# Patient Record
Sex: FEMALE | Race: WHITE | Employment: UNEMPLOYED | URBAN - METROPOLITAN AREA
[De-identification: names, ages, dates, MRNs, and addresses within clinical notes are randomized per-mention and may not be internally consistent; named-entity substitution may affect disease eponyms.]

---

## 2023-11-06 ENCOUNTER — HOSPITAL ENCOUNTER (EMERGENCY)
Age: 28
Discharge: HOME | End: 2023-11-06
Attending: EMERGENCY MEDICINE
Payer: COMMERCIAL

## 2023-11-06 VITALS
SYSTOLIC BLOOD PRESSURE: 131 MMHG | BODY MASS INDEX: 35.85 KG/M2 | WEIGHT: 242.06 LBS | RESPIRATION RATE: 15 BRPM | HEIGHT: 69 IN | DIASTOLIC BLOOD PRESSURE: 74 MMHG | HEART RATE: 99 BPM | TEMPERATURE: 98.5 F | OXYGEN SATURATION: 100 %

## 2023-11-06 DIAGNOSIS — L05.01 PILONIDAL CYST WITH ABSCESS: Primary | ICD-10-CM

## 2023-11-06 PROCEDURE — 99283 EMERGENCY DEPT VISIT LOW MDM: CPT

## 2023-11-06 PROCEDURE — 2500000003 HC RX 250 WO HCPCS

## 2023-11-06 PROCEDURE — 10080 I&D PILONIDAL CYST SIMPLE: CPT

## 2023-11-06 RX ORDER — LIDOCAINE HYDROCHLORIDE 10 MG/ML
INJECTION, SOLUTION EPIDURAL; INFILTRATION; INTRACAUDAL; PERINEURAL
Status: COMPLETED
Start: 2023-11-06 | End: 2023-11-06

## 2023-11-06 RX ORDER — CEPHALEXIN 500 MG/1
500 CAPSULE ORAL 4 TIMES DAILY
Qty: 40 CAPSULE | Refills: 0 | Status: SHIPPED | OUTPATIENT
Start: 2023-11-06 | End: 2023-11-16

## 2023-11-06 RX ADMIN — LIDOCAINE HYDROCHLORIDE: 10 INJECTION, SOLUTION EPIDURAL; INFILTRATION; INTRACAUDAL; PERINEURAL at 15:11

## 2023-11-06 ASSESSMENT — PAIN SCALES - GENERAL
PAINLEVEL_OUTOF10: 5
PAINLEVEL_OUTOF10: 0

## 2023-11-06 ASSESSMENT — PAIN - FUNCTIONAL ASSESSMENT
PAIN_FUNCTIONAL_ASSESSMENT: 0-10
PAIN_FUNCTIONAL_ASSESSMENT: 0-10

## 2023-11-06 ASSESSMENT — LIFESTYLE VARIABLES: HOW OFTEN DO YOU HAVE A DRINK CONTAINING ALCOHOL: NEVER

## 2023-11-06 ASSESSMENT — PAIN DESCRIPTION - PAIN TYPE: TYPE: ACUTE PAIN

## 2023-11-06 ASSESSMENT — PAIN DESCRIPTION - LOCATION: LOCATION: BUTTOCKS

## 2023-11-06 ASSESSMENT — PAIN DESCRIPTION - DESCRIPTORS: DESCRIPTORS: ACHING

## 2023-11-06 ASSESSMENT — PAIN DESCRIPTION - FREQUENCY: FREQUENCY: CONTINUOUS

## 2023-11-06 NOTE — ED NOTES
Discharge and education instructions reviewed. Patient verbalized understanding, teach-back successful. Patient denied questions at this time. No acute distress noted. Patient instructed to follow-up as noted - return to emergency department if symptoms worsen. Patient verbalized understanding. Discharged per EDMD with discharge instructions.           Nelia Booth RN  11/06/23 0855

## 2024-03-01 ENCOUNTER — OFFICE VISIT (OUTPATIENT)
Age: 29
End: 2024-03-01

## 2024-03-01 VITALS
HEART RATE: 117 BPM | TEMPERATURE: 97.8 F | SYSTOLIC BLOOD PRESSURE: 113 MMHG | DIASTOLIC BLOOD PRESSURE: 75 MMHG | WEIGHT: 241 LBS | OXYGEN SATURATION: 96 % | BODY MASS INDEX: 35.59 KG/M2

## 2024-03-01 DIAGNOSIS — J01.00 ACUTE MAXILLARY SINUSITIS, RECURRENCE NOT SPECIFIED: Primary | ICD-10-CM

## 2024-03-01 RX ORDER — PROGESTERONE 200 MG/1
200 CAPSULE ORAL NIGHTLY
COMMUNITY
Start: 2024-01-10

## 2024-03-01 RX ORDER — AZITHROMYCIN 250 MG/1
TABLET, FILM COATED ORAL
Qty: 6 TABLET | Refills: 0 | Status: SHIPPED | OUTPATIENT
Start: 2024-03-01 | End: 2024-03-11

## 2024-03-01 RX ORDER — ESCITALOPRAM OXALATE 10 MG/1
10 TABLET ORAL DAILY
COMMUNITY

## 2024-03-01 RX ORDER — EMTRICITABINE AND TENOFOVIR DISOPROXIL FUMARATE 200; 300 MG/1; MG/1
1 TABLET, FILM COATED ORAL DAILY
COMMUNITY
Start: 2024-01-29

## 2024-03-01 RX ORDER — ESTRADIOL 2 MG/1
TABLET ORAL
COMMUNITY
Start: 2021-10-01

## 2024-03-01 ASSESSMENT — ENCOUNTER SYMPTOMS
ABDOMINAL PAIN: 0
SINUS PAIN: 1
NAUSEA: 0
SINUS PRESSURE: 1
VOMITING: 0
CONSTIPATION: 0
COUGH: 0
SHORTNESS OF BREATH: 0
DIARRHEA: 0
SORE THROAT: 0
CHEST TIGHTNESS: 0

## 2024-03-01 NOTE — PROGRESS NOTES
Cornelia Flores (:  1995) is a 28 y.o. female,New patient, here for evaluation of the following chief complaint(s):  Sinus Problem (Pt in c/o sinus pressure and ha. Had flu a week ago. All flu sxs resolved. Yesterday started with oliva and sinus pressure. )      ASSESSMENT/PLAN:    ICD-10-CM    1. Acute maxillary sinusitis, recurrence not specified  J01.00 azithromycin (ZITHROMAX) 250 MG tablet          Patient presents for sinus pressure. Patient was diagnosed with influenza. On exam no wheezes, rhonchi, rales noted.   Although patient was diagnosed with influenza, they have been ongoing sinus pressure and we will send zpack.   Follow up with PCP in 7-10 days if symptoms persist or if symptoms worsen.    SUBJECTIVE/OBJECTIVE:    History provided by:  Patient   used: No      HPI:   28 y.o. female presents with symptoms of thick and yellow mucus and pressure resolved. Patient was positive for influenza last Friday and then symptoms began resolving and then sinus pressure started. Patient did take dayquil for symptoms and pseudoephedrine.  Afebrile.     Vitals:    24 1317 24 1421   BP: (!) 88/66  Comment: pt states always has  low bp. 113/75   Site: Left Upper Arm Right Upper Arm   Position: Sitting Sitting   Cuff Size: Large Adult Large Adult   Pulse: (!) 117    Temp: 97.8 °F (36.6 °C)    SpO2: 96%    Weight: 109.3 kg (241 lb)        Review of Systems   Constitutional:  Negative for fatigue and fever.   HENT:  Positive for congestion, sinus pressure and sinus pain. Negative for sore throat.    Respiratory:  Negative for cough, chest tightness and shortness of breath.    Cardiovascular:  Negative for chest pain.   Gastrointestinal:  Negative for abdominal pain, constipation, diarrhea, nausea and vomiting.   Genitourinary:  Negative for urgency.   Musculoskeletal:  Negative for neck pain and neck stiffness.   Skin:  Negative for rash.   Neurological:  Negative for dizziness.

## 2024-03-01 NOTE — PATIENT INSTRUCTIONS
Please take medications as prescribed.  Please follow up with family doctor if symptoms persist.   You can also start mucinex for symptoms.

## 2024-10-17 ENCOUNTER — OFFICE VISIT (OUTPATIENT)
Age: 29
End: 2024-10-17

## 2024-10-17 VITALS
SYSTOLIC BLOOD PRESSURE: 107 MMHG | OXYGEN SATURATION: 95 % | WEIGHT: 243.6 LBS | BODY MASS INDEX: 36.08 KG/M2 | HEIGHT: 69 IN | DIASTOLIC BLOOD PRESSURE: 75 MMHG | HEART RATE: 111 BPM | TEMPERATURE: 98.5 F

## 2024-10-17 DIAGNOSIS — J02.0 STREP PHARYNGITIS: Primary | ICD-10-CM

## 2024-10-17 DIAGNOSIS — J02.9 ACUTE PHARYNGITIS, UNSPECIFIED ETIOLOGY: ICD-10-CM

## 2024-10-17 PROBLEM — Z79.899 ON PRE-EXPOSURE PROPHYLAXIS FOR HIV: Status: ACTIVE | Noted: 2024-01-10

## 2024-10-17 PROBLEM — F64.9 GENDER DYSPHORIA: Status: ACTIVE | Noted: 2023-03-15

## 2024-10-17 LAB — S PYO AG THROAT QL: POSITIVE

## 2024-10-17 RX ORDER — AMOXICILLIN 500 MG/1
500 CAPSULE ORAL 2 TIMES DAILY
Qty: 20 CAPSULE | Refills: 0 | Status: SHIPPED | OUTPATIENT
Start: 2024-10-17 | End: 2024-10-27

## 2024-10-17 NOTE — PROGRESS NOTES
[facility]    Cornelia Flores (:  1995 MRN: [id number]) is a 29 y.o. adult, here for evaluation of the following chief complaint(s):  Pharyngitis (PT. C/O PHARYNGITIS THAT STARTED LAST NIGHT, C/O SCRATCHY AND REDNESS TO THROAT, STATES KIDS WERE DX WITH STREP YESTERDAY.)    ASSESSMENT/PLAN:    ICD-10-CM    1. Strep pharyngitis  J02.0       2. Acute pharyngitis, unspecified etiology  J02.9 POCT rapid strep A          New Prescriptions    AMOXICILLIN (AMOXIL) 500 MG CAPSULE    Take 1 capsule by mouth 2 times daily for 10 days     Summary  - The patient tested positive for streptococcus at today's visit.   - I explained the warning signs of when to go to the emergency room.   - Follow up discussed and will be on an as-needed basis.  Differentials include: COVID-19, Laryngitis, GERD, Mononucleosis, Viral Syndrome, Tonsillitis, Post Nasal Drip,    SUBJECTIVE/OBJECTIVE:  HPI  Onset for this issue was 2 day(s) ago. Since the onset, symptoms have been gradually worsening.  Negative symptoms include fever.  Treatments tried include Acetaminophen, NSAID. Reportedly was recently around someone who has strep.       Vitals:    10/17/24 1224   BP: 107/75   Site: Right Upper Arm   Position: Sitting   Cuff Size: Large Adult   Pulse: (!) 111   Temp: 98.5 °F (36.9 °C)   TempSrc: Oral   SpO2: 95%   Weight: 110.5 kg (243 lb 9.6 oz)   Height: 1.753 m (5' 9\")       Results for orders placed or performed in visit on 10/17/24   POCT rapid strep A   Result Value Ref Range    Strep A Ag Positive (A) None Detected        No orders to display     PHYSICAL EXAM  Physical Exam  Vitals reviewed.   Constitutional:       General: She is not in acute distress.     Appearance: Normal appearance. She is normal weight. She is not ill-appearing or toxic-appearing.   HENT:      Head: Normocephalic and atraumatic.      Right Ear: Tympanic membrane, ear canal and external ear normal. There is no impacted cerumen.      Left Ear: Tympanic